# Patient Record
Sex: MALE
[De-identification: names, ages, dates, MRNs, and addresses within clinical notes are randomized per-mention and may not be internally consistent; named-entity substitution may affect disease eponyms.]

---

## 2019-03-18 ENCOUNTER — APPOINTMENT (OUTPATIENT)
Dept: OTOLARYNGOLOGY | Facility: CLINIC | Age: 54
End: 2019-03-18
Payer: MEDICAID

## 2019-03-18 DIAGNOSIS — Z78.9 OTHER SPECIFIED HEALTH STATUS: ICD-10-CM

## 2019-03-18 DIAGNOSIS — H92.03 OTALGIA, BILATERAL: ICD-10-CM

## 2019-03-18 DIAGNOSIS — Z87.891 PERSONAL HISTORY OF NICOTINE DEPENDENCE: ICD-10-CM

## 2019-03-18 DIAGNOSIS — I10 ESSENTIAL (PRIMARY) HYPERTENSION: ICD-10-CM

## 2019-03-18 DIAGNOSIS — H60.8X3 OTHER OTITIS EXTERNA, BILATERAL: ICD-10-CM

## 2019-03-18 PROBLEM — Z00.00 ENCOUNTER FOR PREVENTIVE HEALTH EXAMINATION: Status: ACTIVE | Noted: 2019-03-18

## 2019-03-18 PROCEDURE — 99204 OFFICE O/P NEW MOD 45 MIN: CPT | Mod: 25

## 2019-03-18 PROCEDURE — 92557 COMPREHENSIVE HEARING TEST: CPT

## 2019-03-18 PROCEDURE — 92570 ACOUSTIC IMMITANCE TESTING: CPT

## 2019-03-18 RX ORDER — METOPROLOL TARTRATE 75 MG/1
TABLET, FILM COATED ORAL
Refills: 0 | Status: ACTIVE | COMMUNITY

## 2019-03-18 RX ORDER — TRIAMCINOLONE ACETONIDE 0.25 MG/G
0.03 CREAM TOPICAL
Qty: 1 | Refills: 2 | Status: ACTIVE | COMMUNITY
Start: 2019-03-18 | End: 1900-01-01

## 2019-03-18 NOTE — HISTORY OF PRESENT ILLNESS
[de-identified] : 53 year old patient is present today for otorrhea, otalgia, loss of balance. Patient has a h/o otorrhea, and otalgia 2 years ago. 7 months ago, symptoms started again. Patient went to urgent care 8 weeks ago, and was treated for otitis media with Augmentin. patient states the infection was in the right ear, and once he finished the Augmentin he worsened, and began to have symptoms in the left ear.  He reports  history of 2 years ago, lost hearing in right ear, then resolved. Has foul-smelling drainage in ears. Hearing feels muffled. Augmentin did not help. Denies hx ear surgeries. Also having some imbalance, not room spinning.

## 2019-03-18 NOTE — PHYSICAL EXAM
[Midline] : trachea located in midline position [Normal] : no rashes [Hearing Loss Right Only] : normal [Hearing Loss Left Only] : normal [FreeTextEntry8] : eczematoid changes to conchal bowl, inferiorly with crevice of soft tissue, tender to palpation [FreeTextEntry9] : eczematoid changes to conchal bowl, inferiorly with crevice of soft tissue, tender to palpation

## 2019-03-18 NOTE — REASON FOR VISIT
[Initial Evaluation] : an initial evaluation for [FreeTextEntry2] : otorrhea, otalgia, loss of balance.

## 2019-03-18 NOTE — CONSULT LETTER
[Dear  ___] : Dear  [unfilled], [Consult Letter:] : I had the pleasure of evaluating your patient, [unfilled]. [Consult Closing:] : Thank you very much for allowing me to participate in the care of this patient.  If you have any questions, please do not hesitate to contact me. [Please see my note below.] : Please see my note below. [Sincerely,] : Sincerely, [FreeTextEntry2] : Braden Beard MD [FreeTextEntry3] : Elena Alejandra MD\par Otolaryngology - Head & Neck Surgery\par

## 2019-03-18 NOTE — DATA REVIEWED
[de-identified] : 3/18/19: hearing -8khz bilaterally (mild HL at 6khz in right, 8khz in left), Type A tymps bilaterally, large EAC volume bilaterally

## 2019-03-18 NOTE — ASSESSMENT
[FreeTextEntry1] : - triamcinolone for chronic eczematoid otitis externa\par - reassured patient of essentially normal hearing\par - f/up in 3 months

## 2019-06-13 ENCOUNTER — APPOINTMENT (OUTPATIENT)
Dept: OTOLARYNGOLOGY | Facility: CLINIC | Age: 54
End: 2019-06-13